# Patient Record
Sex: FEMALE | Race: BLACK OR AFRICAN AMERICAN | NOT HISPANIC OR LATINO | Employment: UNEMPLOYED | URBAN - METROPOLITAN AREA
[De-identification: names, ages, dates, MRNs, and addresses within clinical notes are randomized per-mention and may not be internally consistent; named-entity substitution may affect disease eponyms.]

---

## 2019-05-30 PROBLEM — E66.01 SEVERE OBESITY WITH BODY MASS INDEX (BMI) OF 36.0 TO 36.9 WITH SERIOUS COMORBIDITY: Chronic | Status: ACTIVE | Noted: 2019-05-30

## 2019-06-20 PROBLEM — H53.8 BLURRED VISION: Status: ACTIVE | Noted: 2019-06-20

## 2019-06-20 PROBLEM — E11.9 DIABETES MELLITUS WITHOUT COMPLICATION: Status: ACTIVE | Noted: 2019-06-20

## 2019-06-27 PROBLEM — D57.3 SICKLE CELL TRAIT: Status: ACTIVE | Noted: 2019-06-27

## 2019-06-27 PROBLEM — O99.810 HYPERGLYCEMIA DURING PREGNANCY: Status: ACTIVE | Noted: 2017-09-05

## 2019-06-27 PROBLEM — E66.9 OBESITY: Status: ACTIVE | Noted: 2017-09-26

## 2019-07-31 PROBLEM — J30.89 NON-SEASONAL ALLERGIC RHINITIS: Status: ACTIVE | Noted: 2019-07-31

## 2019-11-05 PROBLEM — E78.5 HYPERLIPIDEMIA: Chronic | Status: ACTIVE | Noted: 2019-11-05

## 2020-05-11 PROBLEM — O35.9XX0 TERATOGEN EXPOSURE IN CURRENT PREGNANCY: Status: ACTIVE | Noted: 2020-05-11

## 2020-05-11 PROBLEM — O24.414 WHITE CLASSIFICATION A2 GESTATIONAL DIABETES MELLITUS (GDM), INSULIN CONTROLLED: Status: ACTIVE | Noted: 2020-05-11

## 2020-05-11 PROBLEM — O03.4 INEVITABLE ABORTION: Status: ACTIVE | Noted: 2020-05-11

## 2020-05-11 PROBLEM — R10.9 ABDOMINAL PAIN DURING PREGNANCY, FIRST TRIMESTER: Status: ACTIVE | Noted: 2020-05-11

## 2020-05-11 PROBLEM — Z3A.01 7 WEEKS GESTATION OF PREGNANCY: Status: ACTIVE | Noted: 2020-05-11

## 2020-05-11 PROBLEM — O26.891 ABDOMINAL PAIN DURING PREGNANCY, FIRST TRIMESTER: Status: ACTIVE | Noted: 2020-05-11

## 2020-05-12 ENCOUNTER — SOCIAL WORK (OUTPATIENT)
Dept: ADMINISTRATIVE | Facility: OTHER | Age: 35
End: 2020-05-12

## 2020-05-12 NOTE — PROGRESS NOTES
CAROLEE faxed and scanned pt's notification of pregnancy into epic. No other needs identified at this time.    Sabra Dickerson,MSW  Pager#[phone]

## 2020-09-29 PROBLEM — Z3A.01 7 WEEKS GESTATION OF PREGNANCY: Status: RESOLVED | Noted: 2020-05-11 | Resolved: 2020-09-29

## 2020-09-29 PROBLEM — R10.9 ABDOMINAL PAIN DURING PREGNANCY, FIRST TRIMESTER: Status: RESOLVED | Noted: 2020-05-11 | Resolved: 2020-09-29

## 2020-09-29 PROBLEM — O26.891 ABDOMINAL PAIN DURING PREGNANCY, FIRST TRIMESTER: Status: RESOLVED | Noted: 2020-05-11 | Resolved: 2020-09-29

## 2020-09-29 PROBLEM — O03.4 INEVITABLE ABORTION: Status: RESOLVED | Noted: 2020-05-11 | Resolved: 2020-09-29

## 2021-01-07 PROBLEM — E11.3313 MODERATE NONPROLIFERATIVE DIABETIC RETINOPATHY OF BOTH EYES WITH MACULAR EDEMA ASSOCIATED WITH TYPE 2 DIABETES MELLITUS: Status: ACTIVE | Noted: 2021-01-07

## 2021-01-07 PROBLEM — H35.033 HYPERTENSIVE RETINOPATHY OF BOTH EYES: Status: ACTIVE | Noted: 2021-01-07

## 2021-01-26 PROBLEM — O09.93 SUPERVISION OF HIGH RISK PREGNANCY IN THIRD TRIMESTER: Status: ACTIVE | Noted: 2021-01-26

## 2021-03-02 PROBLEM — O36.4XX0: Status: ACTIVE | Noted: 2021-03-02

## 2021-03-06 PROBLEM — O34.219 VBAC, DELIVERED: Status: ACTIVE | Noted: 2021-03-06

## 2021-03-26 PROBLEM — Z79.4 TYPE 2 DIABETES MELLITUS WITHOUT COMPLICATION, WITH LONG-TERM CURRENT USE OF INSULIN: Status: ACTIVE | Noted: 2021-03-26

## 2021-03-26 PROBLEM — F43.21 GRIEF ASSOCIATED WITH LOSS OF FETUS: Status: ACTIVE | Noted: 2021-03-26

## 2021-03-26 PROBLEM — E11.9 TYPE 2 DIABETES MELLITUS WITHOUT COMPLICATION, WITH LONG-TERM CURRENT USE OF INSULIN: Status: ACTIVE | Noted: 2021-03-26

## 2021-03-26 PROBLEM — I10 ESSENTIAL HYPERTENSION: Status: ACTIVE | Noted: 2021-03-26

## 2021-03-26 PROBLEM — Z30.9 ENCOUNTER FOR CONTRACEPTIVE MANAGEMENT: Status: ACTIVE | Noted: 2021-03-26

## 2022-05-16 PROBLEM — Z30.9 ENCOUNTER FOR CONTRACEPTIVE MANAGEMENT: Status: RESOLVED | Noted: 2021-03-26 | Resolved: 2022-05-16

## 2022-05-16 PROBLEM — F43.21 GRIEF ASSOCIATED WITH LOSS OF FETUS: Status: RESOLVED | Noted: 2021-03-26 | Resolved: 2022-05-16

## 2022-05-24 PROBLEM — O99.012 ANEMIA DURING PREGNANCY IN SECOND TRIMESTER: Status: ACTIVE | Noted: 2022-05-24

## 2022-05-31 PROBLEM — O09.92 SUPERVISION OF HIGH RISK PREGNANCY IN SECOND TRIMESTER: Status: ACTIVE | Noted: 2021-01-26

## 2022-07-01 PROBLEM — Z01.20 ENCOUNTER FOR DENTAL EXAMINATION: Status: ACTIVE | Noted: 2021-03-26

## 2022-07-26 PROBLEM — O36.5930 POOR FETAL GROWTH AFFECTING MANAGEMENT OF MOTHER IN THIRD TRIMESTER: Status: ACTIVE | Noted: 2022-07-26

## 2022-07-26 PROBLEM — O10.919 CHRONIC HYPERTENSION AFFECTING PREGNANCY: Status: ACTIVE | Noted: 2022-07-26

## 2022-09-13 PROBLEM — Z87.59 HISTORY OF INTRAUTERINE FETAL DEATH IN PREVIOUS PREGNANCY: Status: ACTIVE | Noted: 2021-03-02

## 2022-09-13 PROBLEM — O11.9 CHRONIC HYPERTENSION WITH SUPERIMPOSED PREECLAMPSIA: Status: ACTIVE | Noted: 2022-09-13

## 2022-09-13 PROBLEM — O24.319 MODIFIED WHITE CLASS B PREGESTATIONAL DIABETES MELLITUS: Status: ACTIVE | Noted: 2022-09-13

## 2022-09-13 PROBLEM — O14.13 SEVERE PRE-ECLAMPSIA IN THIRD TRIMESTER: Status: ACTIVE | Noted: 2022-09-13

## 2022-09-15 PROBLEM — D62 ACUTE BLOOD LOSS ANEMIA: Status: ACTIVE | Noted: 2022-09-15

## 2022-10-26 PROBLEM — Z01.20 ENCOUNTER FOR DENTAL EXAMINATION: Status: RESOLVED | Noted: 2021-03-26 | Resolved: 2022-10-26

## 2022-10-26 PROBLEM — H35.033 HYPERTENSIVE RETINOPATHY OF BOTH EYES: Status: RESOLVED | Noted: 2021-01-07 | Resolved: 2022-10-26

## 2022-10-26 PROBLEM — O35.9XX0 TERATOGEN EXPOSURE IN CURRENT PREGNANCY: Status: RESOLVED | Noted: 2020-05-11 | Resolved: 2022-10-26

## 2022-10-26 PROBLEM — H53.8 BLURRED VISION: Status: RESOLVED | Noted: 2019-06-20 | Resolved: 2022-10-26

## 2022-10-26 PROBLEM — O36.5930 POOR FETAL GROWTH AFFECTING MANAGEMENT OF MOTHER IN THIRD TRIMESTER: Status: RESOLVED | Noted: 2022-07-26 | Resolved: 2022-10-26

## 2022-10-26 PROBLEM — O10.919 CHRONIC HYPERTENSION AFFECTING PREGNANCY: Status: RESOLVED | Noted: 2022-07-26 | Resolved: 2022-10-26

## 2022-10-26 PROBLEM — O99.012 ANEMIA DURING PREGNANCY IN SECOND TRIMESTER: Status: RESOLVED | Noted: 2022-05-24 | Resolved: 2022-10-26

## 2022-10-26 PROBLEM — O09.93 PREGNANCY, SUPERVISION, HIGH-RISK, THIRD TRIMESTER: Status: RESOLVED | Noted: 2021-01-26 | Resolved: 2022-10-26

## 2022-10-26 PROBLEM — O24.319 MODIFIED WHITE CLASS B PREGESTATIONAL DIABETES MELLITUS: Status: RESOLVED | Noted: 2022-09-13 | Resolved: 2022-10-26

## 2022-10-26 PROBLEM — J30.89 NON-SEASONAL ALLERGIC RHINITIS: Status: RESOLVED | Noted: 2019-07-31 | Resolved: 2022-10-26

## 2022-11-08 PROBLEM — K02.9 CARIES: Status: ACTIVE | Noted: 2022-11-08

## 2023-01-09 PROBLEM — R80.9 MICROPROTEINURIA: Chronic | Status: ACTIVE | Noted: 2023-01-09

## 2024-03-15 ENCOUNTER — PATIENT OUTREACH (OUTPATIENT)
Dept: ADMINISTRATIVE | Facility: HOSPITAL | Age: 39
End: 2024-03-15
Payer: MEDICAID

## 2024-03-15 DIAGNOSIS — E11.9 TYPE 2 DIABETES MELLITUS WITHOUT COMPLICATION, WITH LONG-TERM CURRENT USE OF INSULIN: Primary | ICD-10-CM

## 2024-03-15 DIAGNOSIS — Z79.4 TYPE 2 DIABETES MELLITUS WITHOUT COMPLICATION, WITH LONG-TERM CURRENT USE OF INSULIN: Primary | ICD-10-CM

## 2024-03-21 ENCOUNTER — PATIENT MESSAGE (OUTPATIENT)
Dept: ADMINISTRATIVE | Facility: HOSPITAL | Age: 39
End: 2024-03-21
Payer: MEDICAID

## 2024-06-04 ENCOUNTER — PATIENT OUTREACH (OUTPATIENT)
Dept: ADMINISTRATIVE | Facility: HOSPITAL | Age: 39
End: 2024-06-04
Payer: MEDICAID

## 2024-08-19 PROBLEM — D62 ACUTE BLOOD LOSS ANEMIA: Status: RESOLVED | Noted: 2022-09-15 | Resolved: 2024-08-19

## 2024-08-19 PROBLEM — O14.13 SEVERE PRE-ECLAMPSIA IN THIRD TRIMESTER: Status: RESOLVED | Noted: 2022-09-13 | Resolved: 2024-08-19

## 2024-10-02 PROBLEM — E66.9 OBESITY: Status: RESOLVED | Noted: 2017-09-26 | Resolved: 2024-10-02

## 2024-10-02 PROBLEM — Z79.4 TYPE 2 DIABETES MELLITUS WITH DIABETIC NEPHROPATHY, WITH LONG-TERM CURRENT USE OF INSULIN: Status: ACTIVE | Noted: 2024-10-02

## 2024-10-02 PROBLEM — E11.21 TYPE 2 DIABETES MELLITUS WITH DIABETIC NEPHROPATHY, WITH LONG-TERM CURRENT USE OF INSULIN: Status: ACTIVE | Noted: 2024-10-02

## 2024-10-02 PROBLEM — E11.9 TYPE 2 DIABETES MELLITUS WITHOUT COMPLICATION, WITH LONG-TERM CURRENT USE OF INSULIN: Status: RESOLVED | Noted: 2021-03-26 | Resolved: 2024-10-02

## 2024-10-02 PROBLEM — Z79.4 TYPE 2 DIABETES MELLITUS WITHOUT COMPLICATION, WITH LONG-TERM CURRENT USE OF INSULIN: Status: RESOLVED | Noted: 2021-03-26 | Resolved: 2024-10-02

## 2024-11-21 ENCOUNTER — PATIENT OUTREACH (OUTPATIENT)
Dept: ADMINISTRATIVE | Facility: HOSPITAL | Age: 39
End: 2024-11-21
Payer: MEDICAID

## 2024-11-21 DIAGNOSIS — E11.21 TYPE 2 DIABETES MELLITUS WITH DIABETIC NEPHROPATHY, WITH LONG-TERM CURRENT USE OF INSULIN: Primary | ICD-10-CM

## 2024-11-21 DIAGNOSIS — Z79.4 TYPE 2 DIABETES MELLITUS WITH DIABETIC NEPHROPATHY, WITH LONG-TERM CURRENT USE OF INSULIN: Primary | ICD-10-CM

## 2024-12-04 ENCOUNTER — PATIENT OUTREACH (OUTPATIENT)
Dept: ADMINISTRATIVE | Facility: HOSPITAL | Age: 39
End: 2024-12-04
Payer: MEDICAID

## 2025-01-22 ENCOUNTER — PATIENT OUTREACH (OUTPATIENT)
Dept: ADMINISTRATIVE | Facility: HOSPITAL | Age: 40
End: 2025-01-22
Payer: MEDICAID

## 2025-02-26 PROBLEM — F32.0 CURRENT MILD EPISODE OF MAJOR DEPRESSIVE DISORDER: Chronic | Status: ACTIVE | Noted: 2025-02-26

## 2025-02-26 PROBLEM — E11.42 DIABETIC POLYNEUROPATHY ASSOCIATED WITH TYPE 2 DIABETES MELLITUS: Chronic | Status: ACTIVE | Noted: 2025-02-26

## 2025-02-26 PROBLEM — D50.9 IRON DEFICIENCY ANEMIA: Status: ACTIVE | Noted: 2025-02-26

## 2025-02-26 PROBLEM — J30.81 ALLERGIC RHINITIS DUE TO ANIMAL HAIR AND DANDER: Status: ACTIVE | Noted: 2025-02-26

## 2025-03-17 ENCOUNTER — PATIENT MESSAGE (OUTPATIENT)
Dept: ADMINISTRATIVE | Facility: HOSPITAL | Age: 40
End: 2025-03-17
Payer: MEDICAID

## 2025-05-02 PROBLEM — L02.411 ABSCESS OF AXILLA, RIGHT: Status: ACTIVE | Noted: 2025-05-02

## 2025-05-02 PROBLEM — E11.42 DIABETIC POLYNEUROPATHY ASSOCIATED WITH TYPE 2 DIABETES MELLITUS: Chronic | Status: RESOLVED | Noted: 2025-02-26 | Resolved: 2025-05-02

## 2025-08-05 ENCOUNTER — PATIENT OUTREACH (OUTPATIENT)
Dept: ADMINISTRATIVE | Facility: HOSPITAL | Age: 40
End: 2025-08-05
Payer: MEDICAID

## 2025-08-05 DIAGNOSIS — E11.21 TYPE 2 DIABETES MELLITUS WITH DIABETIC NEPHROPATHY, WITH LONG-TERM CURRENT USE OF INSULIN: Primary | ICD-10-CM

## 2025-08-05 DIAGNOSIS — Z79.4 TYPE 2 DIABETES MELLITUS WITH DIABETIC NEPHROPATHY, WITH LONG-TERM CURRENT USE OF INSULIN: Primary | ICD-10-CM

## 2025-08-28 ENCOUNTER — PATIENT OUTREACH (OUTPATIENT)
Dept: ADMINISTRATIVE | Facility: HOSPITAL | Age: 40
End: 2025-08-28
Payer: MEDICAID

## 2025-08-28 DIAGNOSIS — E11.21 TYPE 2 DIABETES MELLITUS WITH DIABETIC NEPHROPATHY, WITH LONG-TERM CURRENT USE OF INSULIN: Primary | ICD-10-CM

## 2025-08-28 DIAGNOSIS — Z79.4 TYPE 2 DIABETES MELLITUS WITH DIABETIC NEPHROPATHY, WITH LONG-TERM CURRENT USE OF INSULIN: Primary | ICD-10-CM

## 2025-09-03 ENCOUNTER — PATIENT OUTREACH (OUTPATIENT)
Dept: ADMINISTRATIVE | Facility: HOSPITAL | Age: 40
End: 2025-09-03
Payer: MEDICAID

## 2025-09-03 DIAGNOSIS — Z79.4 TYPE 2 DIABETES MELLITUS WITH DIABETIC NEPHROPATHY, WITH LONG-TERM CURRENT USE OF INSULIN: Primary | ICD-10-CM

## 2025-09-03 DIAGNOSIS — E11.21 TYPE 2 DIABETES MELLITUS WITH DIABETIC NEPHROPATHY, WITH LONG-TERM CURRENT USE OF INSULIN: Primary | ICD-10-CM
